# Patient Record
Sex: MALE | Race: WHITE | NOT HISPANIC OR LATINO | URBAN - NONMETROPOLITAN AREA
[De-identification: names, ages, dates, MRNs, and addresses within clinical notes are randomized per-mention and may not be internally consistent; named-entity substitution may affect disease eponyms.]

---

## 2017-06-22 RX ORDER — ATORVASTATIN CALCIUM 40 MG/1
40 TABLET, FILM COATED ORAL DAILY
Qty: 90 TABLET | Refills: 3 | Status: SHIPPED | OUTPATIENT
Start: 2017-06-22

## 2018-01-18 RX ORDER — POTASSIUM CHLORIDE 750 MG/1
TABLET, FILM COATED, EXTENDED RELEASE ORAL
Qty: 30 TABLET | Refills: 11 | OUTPATIENT
Start: 2018-01-18

## 2025-02-07 ENCOUNTER — OFFICE VISIT (OUTPATIENT)
Dept: CARDIOLOGY | Facility: CLINIC | Age: 70
End: 2025-02-07
Payer: MEDICARE

## 2025-02-07 VITALS
SYSTOLIC BLOOD PRESSURE: 153 MMHG | BODY MASS INDEX: 28.47 KG/M2 | DIASTOLIC BLOOD PRESSURE: 84 MMHG | HEART RATE: 72 BPM | HEIGHT: 71 IN | OXYGEN SATURATION: 98 %

## 2025-02-07 DIAGNOSIS — E78.00 PURE HYPERCHOLESTEROLEMIA: ICD-10-CM

## 2025-02-07 DIAGNOSIS — I10 ESSENTIAL HYPERTENSION: ICD-10-CM

## 2025-02-07 DIAGNOSIS — I25.118 CORONARY ARTERY DISEASE OF NATIVE ARTERY OF NATIVE HEART WITH STABLE ANGINA PECTORIS: Primary | ICD-10-CM

## 2025-02-07 PROBLEM — E11.8 DM (DIABETES MELLITUS), TYPE 2 WITH COMPLICATIONS: Status: ACTIVE | Noted: 2025-02-07

## 2025-02-07 PROCEDURE — 93000 ELECTROCARDIOGRAM COMPLETE: CPT | Performed by: INTERNAL MEDICINE

## 2025-02-07 PROCEDURE — 3079F DIAST BP 80-89 MM HG: CPT | Performed by: INTERNAL MEDICINE

## 2025-02-07 PROCEDURE — 3077F SYST BP >= 140 MM HG: CPT | Performed by: INTERNAL MEDICINE

## 2025-02-07 PROCEDURE — 99204 OFFICE O/P NEW MOD 45 MIN: CPT | Performed by: INTERNAL MEDICINE

## 2025-02-07 RX ORDER — GABAPENTIN 300 MG/1
1 CAPSULE ORAL
COMMUNITY

## 2025-02-07 RX ORDER — METFORMIN HYDROCHLORIDE 500 MG/1
TABLET, EXTENDED RELEASE ORAL
COMMUNITY
Start: 2025-01-20

## 2025-02-07 NOTE — PROGRESS NOTES
Reason for Visit: Dyspnea on exertion.    HPI:  Aiden Henson is a 69 y.o. male is being seen for consultation today at the request of CLIF Martines for evaluation of dyspnea on exertion.  He did well following PCI until about a year or so ago, when he started noticing worsening shortness of breath, dyspnea on exertion, intermittent chest discomfort, and diaphoresis.  He eats a lot of sugar and processed foods.  He also tries to eat vegetables.  He does not get much exercise.  He gives out very easily.  He has been sitting around a lot since his knee surgery.  He had blood work done a couple of weeks ago.  Blood pressure is elevated today.      Previous Cardiac Testing and Procedures:  -[facility] (11/17/2015) subtotal occlusion of proximal first OM, 70% stenosis in the AV groove prior to the second OM, status post PCI to LCx and OM with 2.75 x 12 mm, 3.5 x 15 mm and 2.75 x 18 mm Xience Alpine CRYSTAL, 40% mid LAD, 50% tandem disease in first diagonal, no significant disease in RCA, EF 75%  -Echo (6/22/2023) EF 60-65%, mild LVH, grade 1 diastolic dysfunction, normal RV size and function, normal LA and RA, no significant valve dysfunction    Lab data:  -TSH (3/26/2024) 2.77  -Lipid panel (3/26/2024) total cholesterol 206, HDL 41, , triglycerides 106  -BMP (3/26/2024) creatinine 1, potassium 48, sodium 137  -Hemoglobin A1c (3/26/2024) 7.1%    Patient Active Problem List   Diagnosis    Pure hypercholesterolemia    Coronary artery disease of native artery of native heart with stable angina pectoris    Essential hypertension    GERD (gastroesophageal reflux disease)    ST elevation myocardial infarction (STEMI)    DM (diabetes mellitus), type 2 with complications       Social History     Tobacco Use    Smoking status: Former     Passive exposure: Never    Smokeless tobacco: Never   Vaping Use    Vaping status: Never Used   Substance Use Topics    Alcohol use: No    Drug use: No       Family History   Problem  Relation Age of Onset    Heart disease Mother     Heart disease Father     Hyperlipidemia Father     Hypertension Father     Heart disease Brother     Heart disease Brother        The following portions of the patient's history were reviewed and updated as appropriate: allergies, current medications, past family history, past medical history, past social history, past surgical history, and problem list.      Current Outpatient Medications:     aspirin 81 MG EC tablet, Take 1 tablet by mouth Daily., Disp: , Rfl:     atorvastatin (LIPITOR) 40 MG tablet, Take 1 tablet by mouth Daily., Disp: 90 tablet, Rfl: 3    gabapentin (NEURONTIN) 300 MG capsule, Take 1 capsule by mouth every night at bedtime., Disp: , Rfl:     lisinopril-hydrochlorothiazide (PRINZIDE,ZESTORETIC) 20-12.5 MG per tablet, Take 1 tablet by mouth Daily., Disp: , Rfl:     metFORMIN ER (GLUCOPHAGE-XR) 500 MG 24 hr tablet, Take 1 tablet twice a day by oral route for 90 days, for type 2 diabetes., Disp: , Rfl:     metoprolol tartrate (LOPRESSOR) 25 MG tablet, Take 1 tablet by mouth 2 (Two) Times a Day., Disp: 60 tablet, Rfl: 11    omeprazole (priLOSEC) 20 MG capsule, Take 1 capsule by mouth Daily., Disp: , Rfl:     potassium chloride (K-DUR,KLOR-CON) 20 MEQ CR tablet, Take 1 tablet by mouth Daily., Disp: , Rfl:     nitroglycerin (NITROSTAT) 0.4 MG SL tablet, Place 0.4 mg under the tongue Every 5 (Five) Minutes As Needed for chest pain. Take no more than 3 doses in 15 minutes. (Patient not taking: Reported on 2/7/2025), Disp: , Rfl:     Review of Systems   Constitutional: Negative for chills and fever.   Cardiovascular:  Positive for chest pain, dyspnea on exertion and leg swelling. Negative for paroxysmal nocturnal dyspnea.   Respiratory:  Positive for shortness of breath. Negative for cough.    Skin:  Negative for rash.   Gastrointestinal:  Negative for abdominal pain and heartburn.   Neurological:  Negative for dizziness and numbness.       Objective   BP  "153/84 (BP Location: Left arm, Patient Position: Sitting, Cuff Size: Adult)   Pulse 72   Ht 180.3 cm (70.98\")   SpO2 98%   BMI 28.47 kg/m²   Constitutional:       Appearance: Well-developed.   HENT:      Head: Normocephalic and atraumatic.   Pulmonary:      Effort: Pulmonary effort is normal.      Breath sounds: Normal breath sounds.   Cardiovascular:      Normal rate. Regular rhythm.   Edema:     Peripheral edema present.     Pretibial: bilateral trace edema of the pretibial area.  Skin:     General: Skin is warm and dry.   Neurological:      Mental Status: Alert and oriented to person, place, and time.         ECG 12 Lead    Date/Time: 2/7/2025 9:44 AM  Performed by: Terrell Greene MD    Authorized by: Terrell Greene MD  Comparison: compared with previous ECG from 6/13/2023  Similar to previous ECG  Rhythm: sinus rhythm  Rate: normal  Q waves: III and aVF    QRS axis: right            ICD-10-CM ICD-9-CM   1. Coronary artery disease of native artery of native heart with stable angina pectoris  I25.118 414.01     413.9   2. Essential hypertension  I10 401.9   3. Pure hypercholesterolemia  E78.00 272.0         Assessment/Plan:  1.  Coronary artery disease: Status post PCI to LCx and OM on 11/17/2015.  Now having worsening chest pain and shortness of breath consistent with angina.  Evaluate further with a nuclear stress.    2.  Essential hypertension: Blood pressure is elevated today.  Encouraged him to keep a log and monitor this at home.  If no improvement will titrate medical therapy.    3.  Hypercholesterolemia: Lipid panel on 3/26/2024 showed poor control.  He notes that medications were adjusted.  Obtain copy of most recent blood work.  "

## 2025-02-07 NOTE — LETTER
February 7, 2025     CLIF Martines  [address] [place] [address] [place] [address]    Patient: Aiden Henson   YOB: 1955   Date of Visit: 2/7/2025       Dear CLIF Martines,    Thank you for referring Aiden Henson to me for evaluation. Below is a copy of my consult note.    If you have questions, please do not hesitate to call me. I look forward to following Aiden along with you.         Sincerely,        Terrell Greene MD        CC: No Recipients      Reason for Visit: Dyspnea on exertion.    HPI:  Aiden Henson is a 69 y.o. male is being seen for consultation today at the request of CLIF Martines for evaluation of dyspnea on exertion.  He did well following PCI until about a year or so ago, when he started noticing worsening shortness of breath, dyspnea on exertion, intermittent chest discomfort, and diaphoresis.  He eats a lot of sugar and processed foods.  He also tries to eat vegetables.  He does not get much exercise.  He gives out very easily.  He has been sitting around a lot since his knee surgery.  He had blood work done a couple of weeks ago.  Blood pressure is elevated today.      Previous Cardiac Testing and Procedures:  -[facility] (11/17/2015) subtotal occlusion of proximal first OM, 70% stenosis in the AV groove prior to the second OM, status post PCI to LCx and OM with 2.75 x 12 mm, 3.5 x 15 mm and 2.75 x 18 mm Xience Alpine CRYSTAL, 40% mid LAD, 50% tandem disease in first diagonal, no significant disease in RCA, EF 75%  -Echo (6/22/2023) EF 60-65%, mild LVH, grade 1 diastolic dysfunction, normal RV size and function, normal LA and RA, no significant valve dysfunction    Lab data:  -TSH (3/26/2024) 2.77  -Lipid panel (3/26/2024) total cholesterol 206, HDL 41, , triglycerides 106  -BMP (3/26/2024) creatinine 1, potassium 48, sodium 137  -Hemoglobin A1c (3/26/2024) 7.1%    Patient Active Problem List   Diagnosis   • Pure hypercholesterolemia   • Coronary artery  disease of native artery of native heart with stable angina pectoris   • Essential hypertension   • GERD (gastroesophageal reflux disease)   • ST elevation myocardial infarction (STEMI)   • DM (diabetes mellitus), type 2 with complications       Social History     Tobacco Use   • Smoking status: Former     Passive exposure: Never   • Smokeless tobacco: Never   Vaping Use   • Vaping status: Never Used   Substance Use Topics   • Alcohol use: No   • Drug use: No       Family History   Problem Relation Age of Onset   • Heart disease Mother    • Heart disease Father    • Hyperlipidemia Father    • Hypertension Father    • Heart disease Brother    • Heart disease Brother        The following portions of the patient's history were reviewed and updated as appropriate: allergies, current medications, past family history, past medical history, past social history, past surgical history, and problem list.      Current Outpatient Medications:   •  aspirin 81 MG EC tablet, Take 1 tablet by mouth Daily., Disp: , Rfl:   •  atorvastatin (LIPITOR) 40 MG tablet, Take 1 tablet by mouth Daily., Disp: 90 tablet, Rfl: 3  •  gabapentin (NEURONTIN) 300 MG capsule, Take 1 capsule by mouth every night at bedtime., Disp: , Rfl:   •  lisinopril-hydrochlorothiazide (PRINZIDE,ZESTORETIC) 20-12.5 MG per tablet, Take 1 tablet by mouth Daily., Disp: , Rfl:   •  metFORMIN ER (GLUCOPHAGE-XR) 500 MG 24 hr tablet, Take 1 tablet twice a day by oral route for 90 days, for type 2 diabetes., Disp: , Rfl:   •  metoprolol tartrate (LOPRESSOR) 25 MG tablet, Take 1 tablet by mouth 2 (Two) Times a Day., Disp: 60 tablet, Rfl: 11  •  omeprazole (priLOSEC) 20 MG capsule, Take 1 capsule by mouth Daily., Disp: , Rfl:   •  potassium chloride (K-DUR,KLOR-CON) 20 MEQ CR tablet, Take 1 tablet by mouth Daily., Disp: , Rfl:   •  nitroglycerin (NITROSTAT) 0.4 MG SL tablet, Place 0.4 mg under the tongue Every 5 (Five) Minutes As Needed for chest pain. Take no more than 3  "doses in 15 minutes. (Patient not taking: Reported on 2/7/2025), Disp: , Rfl:     Review of Systems   Constitutional: Negative for chills and fever.   Cardiovascular:  Positive for chest pain, dyspnea on exertion and leg swelling. Negative for paroxysmal nocturnal dyspnea.   Respiratory:  Positive for shortness of breath. Negative for cough.    Skin:  Negative for rash.   Gastrointestinal:  Negative for abdominal pain and heartburn.   Neurological:  Negative for dizziness and numbness.       Objective  /84 (BP Location: Left arm, Patient Position: Sitting, Cuff Size: Adult)   Pulse 72   Ht 180.3 cm (70.98\")   SpO2 98%   BMI 28.47 kg/m²   Constitutional:       Appearance: Well-developed.   HENT:      Head: Normocephalic and atraumatic.   Pulmonary:      Effort: Pulmonary effort is normal.      Breath sounds: Normal breath sounds.   Cardiovascular:      Normal rate. Regular rhythm.   Edema:     Peripheral edema present.     Pretibial: bilateral trace edema of the pretibial area.  Skin:     General: Skin is warm and dry.   Neurological:      Mental Status: Alert and oriented to person, place, and time.         ECG 12 Lead    Date/Time: 2/7/2025 9:44 AM  Performed by: Terrell Greene MD    Authorized by: Terrell Greene MD  Comparison: compared with previous ECG from 6/13/2023  Similar to previous ECG  Rhythm: sinus rhythm  Rate: normal  Q waves: III and aVF    QRS axis: right            ICD-10-CM ICD-9-CM   1. Coronary artery disease of native artery of native heart with stable angina pectoris  I25.118 414.01     413.9   2. Essential hypertension  I10 401.9   3. Pure hypercholesterolemia  E78.00 272.0         Assessment/Plan:  1.  Coronary artery disease: Status post PCI to LCx and OM on 11/17/2015.  Now having worsening chest pain and shortness of breath.  Evaluate further with a nuclear stress.    2.  Essential hypertension: Blood pressure is elevated today.  Encouraged him to keep a log and monitor this " at home.  If no improvement will titrate medical therapy.    3.  Hypercholesterolemia: Lipid panel on 3/26/2024 showed poor control.  He notes that medications were adjusted.  Obtain copy of most recent blood work.

## 2025-03-04 ENCOUNTER — HOSPITAL ENCOUNTER (OUTPATIENT)
Dept: CARDIOLOGY | Facility: HOSPITAL | Age: 70
Discharge: HOME | End: 2025-03-04
Payer: MEDICARE

## 2025-03-04 VITALS
HEART RATE: 64 BPM | DIASTOLIC BLOOD PRESSURE: 65 MMHG | SYSTOLIC BLOOD PRESSURE: 117 MMHG | WEIGHT: 220 LBS | HEIGHT: 71 IN | BODY MASS INDEX: 30.8 KG/M2

## 2025-03-04 LAB
BH CV NUCLEAR PRIOR STUDY: 3
BH CV REST NUCLEAR ISOTOPE DOSE: 11.3 MCI
BH CV STRESS BP STAGE 1: NORMAL
BH CV STRESS COMMENTS STAGE 1: NORMAL
BH CV STRESS DOSE REGADENOSON STAGE 1: 0.4
BH CV STRESS DURATION MIN STAGE 1: 0
BH CV STRESS DURATION SEC STAGE 1: 10
BH CV STRESS HR STAGE 1: 96
BH CV STRESS NUCLEAR ISOTOPE DOSE: 36 MCI
BH CV STRESS PROTOCOL 1: NORMAL
BH CV STRESS RECOVERY BP: NORMAL MMHG
BH CV STRESS RECOVERY HR: 79 BPM
BH CV STRESS STAGE 1: 1
MAXIMAL PREDICTED HEART RATE: 152 BPM
PERCENT MAX PREDICTED HR: 63.16 %
SPECT HRT GATED+EF W RNC IV: 42 %
STRESS BASELINE BP: NORMAL MMHG
STRESS BASELINE HR: 65 BPM
STRESS PERCENT HR: 74 %
STRESS POST EXERCISE DUR SEC: 10 SEC
STRESS POST PEAK BP: NORMAL MMHG
STRESS POST PEAK HR: 96 BPM
STRESS TARGET HR: 129 BPM

## 2025-03-04 PROCEDURE — 93017 CV STRESS TEST TRACING ONLY: CPT

## 2025-03-04 PROCEDURE — 25010000002 REGADENOSON 0.4 MG/5ML SOLUTION: Performed by: INTERNAL MEDICINE

## 2025-03-04 PROCEDURE — 78452 HT MUSCLE IMAGE SPECT MULT: CPT

## 2025-03-04 PROCEDURE — A9502 TC99M TETROFOSMIN: HCPCS | Performed by: INTERNAL MEDICINE

## 2025-03-04 PROCEDURE — 34310000005 TECHNETIUM TETROFOSMIN KIT: Performed by: INTERNAL MEDICINE

## 2025-03-04 RX ORDER — REGADENOSON 0.08 MG/ML
0.4 INJECTION, SOLUTION INTRAVENOUS ONCE
Status: COMPLETED | OUTPATIENT
Start: 2025-03-04 | End: 2025-03-04

## 2025-03-04 RX ADMIN — TETROFOSMIN 1 DOSE: 1.38 INJECTION, POWDER, LYOPHILIZED, FOR SOLUTION INTRAVENOUS at 08:30

## 2025-03-04 RX ADMIN — TETROFOSMIN 1 DOSE: 1.38 INJECTION, POWDER, LYOPHILIZED, FOR SOLUTION INTRAVENOUS at 07:10

## 2025-03-04 RX ADMIN — REGADENOSON 0.4 MG: 0.08 INJECTION, SOLUTION INTRAVENOUS at 08:22

## 2025-03-10 ENCOUNTER — RESULTS FOLLOW-UP (OUTPATIENT)
Dept: CARDIOLOGY | Facility: CLINIC | Age: 70
End: 2025-03-10
Payer: MEDICARE

## 2025-04-21 ENCOUNTER — OFFICE VISIT (OUTPATIENT)
Dept: CARDIOLOGY | Facility: CLINIC | Age: 70
End: 2025-04-21
Payer: MEDICARE

## 2025-04-21 VITALS
BODY MASS INDEX: 28.84 KG/M2 | DIASTOLIC BLOOD PRESSURE: 72 MMHG | OXYGEN SATURATION: 98 % | HEART RATE: 66 BPM | SYSTOLIC BLOOD PRESSURE: 120 MMHG | HEIGHT: 71 IN | WEIGHT: 206 LBS

## 2025-04-21 DIAGNOSIS — I10 ESSENTIAL HYPERTENSION: ICD-10-CM

## 2025-04-21 DIAGNOSIS — I25.10 CORONARY ARTERY DISEASE INVOLVING NATIVE CORONARY ARTERY OF NATIVE HEART WITHOUT ANGINA PECTORIS: Primary | ICD-10-CM

## 2025-04-21 DIAGNOSIS — E78.00 PURE HYPERCHOLESTEROLEMIA: ICD-10-CM

## 2025-04-21 PROCEDURE — 99214 OFFICE O/P EST MOD 30 MIN: CPT | Performed by: INTERNAL MEDICINE

## 2025-04-21 PROCEDURE — 1159F MED LIST DOCD IN RCRD: CPT | Performed by: INTERNAL MEDICINE

## 2025-04-21 PROCEDURE — 3078F DIAST BP <80 MM HG: CPT | Performed by: INTERNAL MEDICINE

## 2025-04-21 PROCEDURE — G2211 COMPLEX E/M VISIT ADD ON: HCPCS | Performed by: INTERNAL MEDICINE

## 2025-04-21 PROCEDURE — 3074F SYST BP LT 130 MM HG: CPT | Performed by: INTERNAL MEDICINE

## 2025-04-21 PROCEDURE — 1160F RVW MEDS BY RX/DR IN RCRD: CPT | Performed by: INTERNAL MEDICINE

## 2025-04-21 RX ORDER — POTASSIUM CHLORIDE 750 MG/1
1 TABLET, EXTENDED RELEASE ORAL DAILY
COMMUNITY
Start: 2025-02-05

## 2025-04-21 NOTE — LETTER
April 21, 2025     CLIF Martines  [address] [facility] [place] [address]    Patient: Aiden Henson   YOB: 1955   Date of Visit: 4/21/2025       Dear CLIF Martines    Aiden Henson was in my office today. Below is a copy of my note.    If you have questions, please do not hesitate to call me. I look forward to following Aiden along with you.         Sincerely,        Terrell Greene MD        CC: No Recipients      Reason for Visit: cardiovascular follow up.    HPI:  Aiden Henson is a 69 y.o. male is here today for follow-up.  He was seen in cardiology consultation on 2/7/2025 for evaluation of dyspnea on exertion.  He also noted intermittent chest discomfort and diaphoresis.  He underwent a nuclear stress test on 3/4/2025 showing a prior infarct in the basal to mid anterior lateral wall with no significant ischemia.  He reports feeling better for the most part.  Intermittently he will feel lightheaded and dizzy.  This often worsens when standing up or bending over.  He has been eating healthier and is down 14 lbs compared to last visit.  He denies any chest pain, palpitations, syncope, PND, or orthopnea.  He walks about a mile 2 to 3 times per week.      Previous Cardiac Testing and Procedures:  -[facility] (11/17/2015) subtotal occlusion of proximal first OM, 70% stenosis in the AV groove prior to the second OM, status post PCI to LCx and OM with 2.75 x 12 mm, 3.5 x 15 mm and 2.75 x 18 mm Xience Alpine CRYSTAL, 40% mid LAD, 50% tandem disease in first diagonal, no significant disease in RCA, EF 75%  -Echo (6/22/2023) EF 60-65%, mild LVH, grade 1 diastolic dysfunction, normal RV size and function, normal LA and RA, no significant valve dysfunction  -Nuclear stress (3/4/2025) moderate size infarct in the basal to mid anterolateral wall, no significant ischemia, EF 42%    Lab data:  -TSH (3/26/2024) 2.77  -Lipid panel (3/26/2024) total cholesterol 206, HDL 41, , triglycerides  106  -BMP (3/26/2024) creatinine 1, potassium 48, sodium 137  -Hemoglobin A1c (3/26/2024) 7.1%  - Lipid panel (1/22/2025) total cholesterol 140, HDL 34, LDL 86, triglycerides 102    Patient Active Problem List   Diagnosis   • Pure hypercholesterolemia   • Coronary artery disease involving native coronary artery of native heart without angina pectoris   • Essential hypertension   • GERD (gastroesophageal reflux disease)   • History of ST elevation myocardial infarction (STEMI)   • DM (diabetes mellitus), type 2 with complications       Social History     Tobacco Use   • Smoking status: Former     Passive exposure: Never   • Smokeless tobacco: Never   Vaping Use   • Vaping status: Never Used   Substance Use Topics   • Alcohol use: No   • Drug use: No       Family History   Problem Relation Age of Onset   • Heart disease Mother    • Heart disease Father    • Hyperlipidemia Father    • Hypertension Father    • Heart disease Brother    • Heart disease Brother        The following portions of the patient's history were reviewed and updated as appropriate: allergies, current medications, past family history, past medical history, past social history, past surgical history, and problem list.      Current Outpatient Medications:   •  aspirin 81 MG EC tablet, Take 1 tablet by mouth Daily., Disp: , Rfl:   •  atorvastatin (LIPITOR) 40 MG tablet, Take 1 tablet by mouth Daily., Disp: 90 tablet, Rfl: 3  •  cholecalciferol (VITAMIN D3) 1.25 MG (61238 UT) capsule, Take 1 capsule by mouth 1 (One) Time Per Week., Disp: , Rfl:   •  gabapentin (NEURONTIN) 300 MG capsule, Take 1 capsule by mouth every night at bedtime., Disp: , Rfl:   •  lisinopril-hydrochlorothiazide (PRINZIDE,ZESTORETIC) 20-12.5 MG per tablet, Take 1 tablet by mouth Daily., Disp: , Rfl:   •  metFORMIN ER (GLUCOPHAGE-XR) 500 MG 24 hr tablet, Take 1 tablet twice a day by oral route for 90 days, for type 2 diabetes., Disp: , Rfl:   •  metoprolol tartrate (LOPRESSOR) 25 MG  "tablet, Take 1 tablet by mouth 2 (Two) Times a Day., Disp: 60 tablet, Rfl: 11  •  nitroglycerin (NITROSTAT) 0.4 MG SL tablet, Place 1 tablet under the tongue Every 5 (Five) Minutes As Needed for Chest Pain. Take no more than 3 doses in 15 minutes., Disp: , Rfl:   •  omeprazole (priLOSEC) 20 MG capsule, Take 1 capsule by mouth Daily., Disp: , Rfl:   •  potassium chloride (K-DUR,KLOR-CON) 20 MEQ CR tablet, Take 1 tablet by mouth Daily., Disp: , Rfl:   •  potassium chloride 10 MEQ CR tablet, Take 1 tablet by mouth Daily., Disp: , Rfl:     Review of Systems   Constitutional: Negative for chills and fever.   Cardiovascular:  Negative for chest pain and paroxysmal nocturnal dyspnea.   Respiratory:  Negative for cough and shortness of breath.    Skin:  Negative for rash.   Gastrointestinal:  Negative for abdominal pain and heartburn.   Neurological:  Positive for dizziness and loss of balance. Negative for numbness.       Objective  /72 (BP Location: Left arm, Patient Position: Sitting, Cuff Size: Adult)   Pulse 66   Ht 180.3 cm (70.98\")   Wt 93.4 kg (206 lb)   SpO2 98%   BMI 28.74 kg/m²   Constitutional:       Appearance: Well-developed.   HENT:      Head: Normocephalic and atraumatic.   Pulmonary:      Effort: Pulmonary effort is normal.      Breath sounds: Normal breath sounds.   Cardiovascular:      Normal rate. Regular rhythm.   Edema:     Peripheral edema absent.   Skin:     General: Skin is warm and dry.   Neurological:      Mental Status: Alert and oriented to person, place, and time.       Procedures      ICD-10-CM ICD-9-CM   1. Coronary artery disease involving native coronary artery of native heart without angina pectoris  I25.10 414.01   2. Essential hypertension  I10 401.9   3. Pure hypercholesterolemia  E78.00 272.0         Assessment/Plan:  1.  Coronary artery disease: Status post PCI to LCx and OM on 11/17/2015.  Nuclear stress on 3/4/2025 showed a prior infarct in the basal to mid anterolateral " wall with no significant ischemia.  He denies any recent chest pain.  Continue aspirin, atorvastatin, metoprolol, and nitroglycerin.       2.  Essential hypertension: Blood pressure is well controlled today.  Continue lisinopril, HCTZ, and metoprolol.      3.  Hypercholesterolemia: Borderline LDL at 86 on lipid panel on 1/22/2025, but significantly improved compared to prior lipid panel.  Continue atorvastatin along with lifestyle modification.

## 2025-04-21 NOTE — PROGRESS NOTES
Reason for Visit: cardiovascular follow up.    HPI:  Aiden Henson is a 69 y.o. male is here today for follow-up.  He was seen in cardiology consultation on 2/7/2025 for evaluation of dyspnea on exertion.  He also noted intermittent chest discomfort and diaphoresis.  He underwent a nuclear stress test on 3/4/2025 showing a prior infarct in the basal to mid anterior lateral wall with no significant ischemia.  He reports feeling better for the most part.  Intermittently he will feel lightheaded and dizzy.  This often worsens when standing up or bending over.  He has been eating healthier and is down 14 lbs compared to last visit.  He denies any chest pain, palpitations, syncope, PND, or orthopnea.  He walks about a mile 2 to 3 times per week.      Previous Cardiac Testing and Procedures:  -[facility] (11/17/2015) subtotal occlusion of proximal first OM, 70% stenosis in the AV groove prior to the second OM, status post PCI to LCx and OM with 2.75 x 12 mm, 3.5 x 15 mm and 2.75 x 18 mm Xience Alpine CRYSTAL, 40% mid LAD, 50% tandem disease in first diagonal, no significant disease in RCA, EF 75%  -Echo (6/22/2023) EF 60-65%, mild LVH, grade 1 diastolic dysfunction, normal RV size and function, normal LA and RA, no significant valve dysfunction  -Nuclear stress (3/4/2025) moderate size infarct in the basal to mid anterolateral wall, no significant ischemia, EF 42%    Lab data:  -TSH (3/26/2024) 2.77  -Lipid panel (3/26/2024) total cholesterol 206, HDL 41, , triglycerides 106  -BMP (3/26/2024) creatinine 1, potassium 48, sodium 137  -Hemoglobin A1c (3/26/2024) 7.1%  - Lipid panel (1/22/2025) total cholesterol 140, HDL 34, LDL 86, triglycerides 102    Patient Active Problem List   Diagnosis    Pure hypercholesterolemia    Coronary artery disease involving native coronary artery of native heart without angina pectoris    Essential hypertension    GERD (gastroesophageal reflux disease)    History of ST elevation  myocardial infarction (STEMI)    DM (diabetes mellitus), type 2 with complications       Social History     Tobacco Use    Smoking status: Former     Passive exposure: Never    Smokeless tobacco: Never   Vaping Use    Vaping status: Never Used   Substance Use Topics    Alcohol use: No    Drug use: No       Family History   Problem Relation Age of Onset    Heart disease Mother     Heart disease Father     Hyperlipidemia Father     Hypertension Father     Heart disease Brother     Heart disease Brother        The following portions of the patient's history were reviewed and updated as appropriate: allergies, current medications, past family history, past medical history, past social history, past surgical history, and problem list.      Current Outpatient Medications:     aspirin 81 MG EC tablet, Take 1 tablet by mouth Daily., Disp: , Rfl:     atorvastatin (LIPITOR) 40 MG tablet, Take 1 tablet by mouth Daily., Disp: 90 tablet, Rfl: 3    cholecalciferol (VITAMIN D3) 1.25 MG (01763 UT) capsule, Take 1 capsule by mouth 1 (One) Time Per Week., Disp: , Rfl:     gabapentin (NEURONTIN) 300 MG capsule, Take 1 capsule by mouth every night at bedtime., Disp: , Rfl:     lisinopril-hydrochlorothiazide (PRINZIDE,ZESTORETIC) 20-12.5 MG per tablet, Take 1 tablet by mouth Daily., Disp: , Rfl:     metFORMIN ER (GLUCOPHAGE-XR) 500 MG 24 hr tablet, Take 1 tablet twice a day by oral route for 90 days, for type 2 diabetes., Disp: , Rfl:     metoprolol tartrate (LOPRESSOR) 25 MG tablet, Take 1 tablet by mouth 2 (Two) Times a Day., Disp: 60 tablet, Rfl: 11    nitroglycerin (NITROSTAT) 0.4 MG SL tablet, Place 1 tablet under the tongue Every 5 (Five) Minutes As Needed for Chest Pain. Take no more than 3 doses in 15 minutes., Disp: , Rfl:     omeprazole (priLOSEC) 20 MG capsule, Take 1 capsule by mouth Daily., Disp: , Rfl:     potassium chloride (K-DUR,KLOR-CON) 20 MEQ CR tablet, Take 1 tablet by mouth Daily., Disp: , Rfl:     potassium  "chloride 10 MEQ CR tablet, Take 1 tablet by mouth Daily., Disp: , Rfl:     Review of Systems   Constitutional: Negative for chills and fever.   HENT:  Positive for congestion.    Cardiovascular:  Negative for chest pain and paroxysmal nocturnal dyspnea.   Respiratory:  Negative for cough and shortness of breath.    Skin:  Negative for rash.   Gastrointestinal:  Negative for abdominal pain and heartburn.   Neurological:  Positive for dizziness and loss of balance. Negative for numbness.       Objective   /72 (BP Location: Left arm, Patient Position: Sitting, Cuff Size: Adult)   Pulse 66   Ht 180.3 cm (70.98\")   Wt 93.4 kg (206 lb)   SpO2 98%   BMI 28.74 kg/m²   Constitutional:       Appearance: Well-developed.   HENT:      Head: Normocephalic and atraumatic.   Pulmonary:      Effort: Pulmonary effort is normal.      Breath sounds: Normal breath sounds.   Cardiovascular:      Normal rate. Regular rhythm.   Edema:     Peripheral edema absent.   Skin:     General: Skin is warm and dry.   Neurological:      Mental Status: Alert and oriented to person, place, and time.       Procedures      ICD-10-CM ICD-9-CM   1. Coronary artery disease involving native coronary artery of native heart without angina pectoris  I25.10 414.01   2. Essential hypertension  I10 401.9   3. Pure hypercholesterolemia  E78.00 272.0         Assessment/Plan:  1.  Coronary artery disease: Status post PCI to LCx and OM on 11/17/2015.  Nuclear stress on 3/4/2025 showed a prior infarct in the basal to mid anterolateral wall with no significant ischemia.  He denies any recent chest pain.  Continue aspirin, atorvastatin, metoprolol, and nitroglycerin.       2.  Essential hypertension: Blood pressure is well controlled today.  Continue lisinopril, HCTZ, and metoprolol.      3.  Hypercholesterolemia: Borderline LDL at 86 on lipid panel on 1/22/2025, but significantly improved compared to prior lipid panel.  Continue atorvastatin along with " lifestyle modification.